# Patient Record
Sex: FEMALE | Race: WHITE | NOT HISPANIC OR LATINO | URBAN - METROPOLITAN AREA
[De-identification: names, ages, dates, MRNs, and addresses within clinical notes are randomized per-mention and may not be internally consistent; named-entity substitution may affect disease eponyms.]

---

## 2023-08-20 ENCOUNTER — WEB ENCOUNTER (OUTPATIENT)
Dept: URBAN - METROPOLITAN AREA CLINIC 12 | Facility: CLINIC | Age: 58
End: 2023-08-20

## 2023-08-21 ENCOUNTER — OFFICE VISIT (OUTPATIENT)
Dept: URBAN - METROPOLITAN AREA TELEHEALTH 2 | Facility: TELEHEALTH | Age: 58
End: 2023-08-21
Payer: COMMERCIAL

## 2023-08-21 ENCOUNTER — LAB OUTSIDE AN ENCOUNTER (OUTPATIENT)
Dept: URBAN - METROPOLITAN AREA TELEHEALTH 2 | Facility: TELEHEALTH | Age: 58
End: 2023-08-21

## 2023-08-21 VITALS
DIASTOLIC BLOOD PRESSURE: 80 MMHG | HEIGHT: 64 IN | SYSTOLIC BLOOD PRESSURE: 147 MMHG | WEIGHT: 220 LBS | BODY MASS INDEX: 37.56 KG/M2

## 2023-08-21 DIAGNOSIS — R13.19 ESOPHAGEAL DYSPHAGIA: ICD-10-CM

## 2023-08-21 DIAGNOSIS — K44.9 HIATAL HERNIA: ICD-10-CM

## 2023-08-21 DIAGNOSIS — Z12.11 COLON CANCER SCREENING: ICD-10-CM

## 2023-08-21 DIAGNOSIS — R10.10 UPPER ABDOMINAL PAIN: ICD-10-CM

## 2023-08-21 PROCEDURE — 99204 OFFICE O/P NEW MOD 45 MIN: CPT | Performed by: STUDENT IN AN ORGANIZED HEALTH CARE EDUCATION/TRAINING PROGRAM

## 2023-08-21 RX ORDER — PANTOPRAZOLE SODIUM 40 MG/1
1 TABLET TABLET, DELAYED RELEASE ORAL
Qty: 180 | Refills: 3 | OUTPATIENT
Start: 2023-08-21

## 2023-08-21 NOTE — HPI-OTHER HISTORIES
58F with GERD here for evaluation for egd/colonoscopy Reports worsening GERD Symptoms started in July -- went to [facility] Upper abdominal pain. Difficulty swallowing EGD in 2010 with Dr. Thornton  Says she has discomfort when she tries to lay down Says she has trouble taking a deep breath, but can improve sometimes when she stands up. She has lost 35 lbs intentionally, which is improving her Sx.  She takes Rx omeprazole 40mg QAM , famotidine 40mg QPM. Helps but not completely.  She endorses some constipation -- She had a colonoscopy 2014 -- polyp removed was lymphoid aggregate, random colon neg EGD 2014 -- reflux esophagitis, mild gastritis HP neg

## 2023-08-25 ENCOUNTER — WEB ENCOUNTER (OUTPATIENT)
Dept: URBAN - METROPOLITAN AREA CLINIC 12 | Facility: CLINIC | Age: 58
End: 2023-08-25

## 2023-08-30 ENCOUNTER — WEB ENCOUNTER (OUTPATIENT)
Dept: URBAN - METROPOLITAN AREA CLINIC 12 | Facility: CLINIC | Age: 58
End: 2023-08-30

## 2023-09-05 ENCOUNTER — LAB OUTSIDE AN ENCOUNTER (OUTPATIENT)
Dept: URBAN - METROPOLITAN AREA CLINIC 23 | Facility: CLINIC | Age: 58
End: 2023-09-05

## 2023-09-05 ENCOUNTER — OFFICE VISIT (OUTPATIENT)
Dept: URBAN - METROPOLITAN AREA LAB 3 | Facility: LAB | Age: 58
End: 2023-09-05
Payer: COMMERCIAL

## 2023-09-05 ENCOUNTER — OFFICE VISIT (OUTPATIENT)
Dept: URBAN - METROPOLITAN AREA LAB 3 | Facility: LAB | Age: 58
End: 2023-09-05

## 2023-09-05 DIAGNOSIS — D12.5 ADENOMA OF SIGMOID COLON: ICD-10-CM

## 2023-09-05 DIAGNOSIS — Z12.11 COLON CANCER SCREENING: ICD-10-CM

## 2023-09-05 PROCEDURE — 45385 COLONOSCOPY W/LESION REMOVAL: CPT | Performed by: STUDENT IN AN ORGANIZED HEALTH CARE EDUCATION/TRAINING PROGRAM

## 2023-09-05 RX ORDER — PANTOPRAZOLE SODIUM 40 MG/1
1 TABLET TABLET, DELAYED RELEASE ORAL
Qty: 180 | Refills: 3 | Status: ACTIVE | COMMUNITY
Start: 2023-08-21

## 2023-09-06 LAB
GLUCOSE POC: 106
PERFORMING LAB: (no result)

## 2023-09-20 ENCOUNTER — OFFICE VISIT (OUTPATIENT)
Dept: URBAN - METROPOLITAN AREA CLINIC 12 | Facility: CLINIC | Age: 58
End: 2023-09-20
Payer: COMMERCIAL

## 2023-09-20 ENCOUNTER — DASHBOARD ENCOUNTERS (OUTPATIENT)
Age: 58
End: 2023-09-20

## 2023-09-20 VITALS
TEMPERATURE: 97.7 F | DIASTOLIC BLOOD PRESSURE: 77 MMHG | WEIGHT: 217 LBS | BODY MASS INDEX: 37.05 KG/M2 | HEART RATE: 62 BPM | SYSTOLIC BLOOD PRESSURE: 139 MMHG | HEIGHT: 64 IN

## 2023-09-20 DIAGNOSIS — K21.9 CHRONIC GERD: ICD-10-CM

## 2023-09-20 DIAGNOSIS — Z86.010 PERSONAL HISTORY OF COLONIC POLYPS: ICD-10-CM

## 2023-09-20 DIAGNOSIS — K44.9 HIATAL HERNIA: ICD-10-CM

## 2023-09-20 PROCEDURE — 99213 OFFICE O/P EST LOW 20 MIN: CPT | Performed by: STUDENT IN AN ORGANIZED HEALTH CARE EDUCATION/TRAINING PROGRAM

## 2023-09-20 RX ORDER — PANTOPRAZOLE SODIUM 40 MG/1
1 TABLET TABLET, DELAYED RELEASE ORAL
Qty: 180 | Refills: 3 | Status: ACTIVE | COMMUNITY
Start: 2023-08-21

## 2023-09-20 NOTE — HPI-TODAY'S VISIT:
57 yo F here for follow up. She is taking pantoprazole 40mg for GERD. She saw Dr. Gibbs recently -- had EGD with endoflip. Mild hiatal hernia. Bravo pending. SHe is also having a gastroparesis study next week.  CT scan reviewed -- ventral hernia, diverticulosis Colonoscopy with diverticulosis and 5mm tubulovillous adenoma. Otherwise denies acute complaints

## 2023-09-24 PROBLEM — 428283002: Status: ACTIVE | Noted: 2023-09-24

## 2023-09-24 PROBLEM — 235595009: Status: ACTIVE | Noted: 2023-09-24
